# Patient Record
Sex: MALE | Race: BLACK OR AFRICAN AMERICAN
[De-identification: names, ages, dates, MRNs, and addresses within clinical notes are randomized per-mention and may not be internally consistent; named-entity substitution may affect disease eponyms.]

---

## 2019-12-19 ENCOUNTER — APPOINTMENT (OUTPATIENT)
Dept: UROLOGY | Facility: CLINIC | Age: 69
End: 2019-12-19
Payer: MEDICARE

## 2019-12-19 VITALS
DIASTOLIC BLOOD PRESSURE: 80 MMHG | WEIGHT: 172 LBS | SYSTOLIC BLOOD PRESSURE: 130 MMHG | HEIGHT: 71 IN | HEART RATE: 88 BPM | OXYGEN SATURATION: 98 % | RESPIRATION RATE: 13 BRPM | BODY MASS INDEX: 24.08 KG/M2

## 2019-12-19 DIAGNOSIS — Z86.79 PERSONAL HISTORY OF OTHER DISEASES OF THE CIRCULATORY SYSTEM: ICD-10-CM

## 2019-12-19 DIAGNOSIS — Z86.69 PERSONAL HISTORY OF OTHER DISEASES OF THE NERVOUS SYSTEM AND SENSE ORGANS: ICD-10-CM

## 2019-12-19 DIAGNOSIS — Z78.9 OTHER SPECIFIED HEALTH STATUS: ICD-10-CM

## 2019-12-19 PROBLEM — Z00.00 ENCOUNTER FOR PREVENTIVE HEALTH EXAMINATION: Status: ACTIVE | Noted: 2019-12-19

## 2019-12-19 PROCEDURE — 51741 ELECTRO-UROFLOWMETRY FIRST: CPT

## 2019-12-19 PROCEDURE — 99204 OFFICE O/P NEW MOD 45 MIN: CPT | Mod: 25

## 2019-12-19 NOTE — REVIEW OF SYSTEMS
[Eyesight Problems] : eyesight problems [Dry Eyes] : dryness of the eyes [Urine retention] : urine retention [Wake up at night to urinate  How many times?  ___] : wakes up to urinate [unfilled] times during the night [Strain or push to urinate] : strain or push to urinate [Slow urine stream] : slow urine stream [Negative] : Endocrine [FreeTextEntry3] : Frequent Urination

## 2019-12-19 NOTE — PHYSICAL EXAM
[General Appearance - Well Developed] : well developed [General Appearance - Well Nourished] : well nourished [Well Groomed] : well groomed [Normal Appearance] : normal appearance [General Appearance - In No Acute Distress] : no acute distress [Edema] : no peripheral edema [Exaggerated Use Of Accessory Muscles For Inspiration] : no accessory muscle use [Respiration, Rhythm And Depth] : normal respiratory rhythm and effort [Abdomen Soft] : soft [Abdomen Tenderness] : non-tender [Urethral Meatus] : meatus normal [Costovertebral Angle Tenderness] : no ~M costovertebral angle tenderness [Urinary Bladder Findings] : the bladder was normal on palpation [Prostate Tenderness] : the prostate was not tender [Scrotum] : the scrotum was normal [Testes Mass (___cm)] : there were no testicular masses [No Prostate Nodules] : no prostate nodules [Prostate Enlarged] : was enlarged [FreeTextEntry1] : Uncircumcised, vitiligo, one 2 cm plaque palpated dorsally midshaft [No Focal Deficits] : no focal deficits [Normal Station and Gait] : the gait and station were normal for the patient's age [] : no rash [Oriented To Time, Place, And Person] : oriented to person, place, and time [Mood] : the mood was normal [Affect] : the affect was normal [Not Anxious] : not anxious [Inguinal Lymph Nodes Enlarged Bilaterally] : inguinal

## 2019-12-19 NOTE — LETTER BODY
[Dear  ___] : Dear  [unfilled], [Consult Letter:] : I had the pleasure of evaluating your patient, [unfilled]. [Consult Closing:] : Thank you very much for allowing me to participate in the care of this patient.  If you have any questions, please do not hesitate to contact me. [FreeTextEntry1] : [facility]\par [address] \par [place], [place], [address]  \par [phone] \par

## 2019-12-19 NOTE — HISTORY OF PRESENT ILLNESS
[FreeTextEntry1] : He is a 68-year-old man who is seen today for initial visit. He has nocturia 3-5 times despite limiting fluid intake. Urinary flow is slow and interrupted. There is no hematuria or dysuria. There is no flank pain. He does not complain of erectile dysfunction. However, he has noticed curvature of the penis upwards with erections about 45°. There is no history of trauma to the penis. In December 2019, urinalysis was normal and PSA level was 1.86. Maximum urinary flow was 7.2 mL per second, average 3.5 mL/s, voided one 109 cc and residual urine was about 200 cc.

## 2019-12-19 NOTE — ASSESSMENT
[FreeTextEntry1] : We had a long discussion regarding patient's urinary symptoms. Initial workup with cystoscopy, determination of urinary flow rate, post void residual volume and urodynamic study was discussed. We discussed conservative management without using medications such as controlling constipation, limiting fluids before bed-time, and limiting irritating substances such as coffee, tea, alcohol, spicy foods, etc. We also discussed medication therapy for treatment of urinary symptoms with alpha-blocker therapy (such as Flomax, Rapaflo), 5 alpha reductase inhibitors (such as Proscar and Avodart), Cialis 5 mg daily especially if ED is present, anticholinergic medications (such as VESIcare, Toviaz), Myrbetriq or a combination of the above medications. Treatment of overactive bladder symptoms with Botox intravesical injections was reviewed. Also surgical treatment options such as office microwave thermotherapy, photo-vaporization of the prostate (Greenlight laser), TURP or suprapubic prostatectomy based on the size of the prostate, were discussed and side effects reviewed. Questions were answered. He will start Flomax. Also options for Peyronie's disease were discussed, traction device, penile Xiaflex injections and surgical options. He might consider traction device for now.\par \par Milind Vernon MD, FACS\par [facility]\par  of Urology\par \par [address], [address]\[place] [address]\par \par [address], [address]\[place] [address]\par \par Tel: [phone]\par Fax: [phone]

## 2020-05-04 ENCOUNTER — APPOINTMENT (OUTPATIENT)
Dept: UROLOGY | Facility: CLINIC | Age: 70
End: 2020-05-04
Payer: MEDICARE

## 2020-05-04 VITALS
TEMPERATURE: 98 F | HEART RATE: 98 BPM | OXYGEN SATURATION: 98 % | SYSTOLIC BLOOD PRESSURE: 150 MMHG | DIASTOLIC BLOOD PRESSURE: 95 MMHG

## 2020-05-04 PROCEDURE — 99213 OFFICE O/P EST LOW 20 MIN: CPT

## 2020-05-04 NOTE — PHYSICAL EXAM
[General Appearance - Well Nourished] : well nourished [General Appearance - Well Developed] : well developed [Normal Appearance] : normal appearance [Abdomen Soft] : soft [Well Groomed] : well groomed [General Appearance - In No Acute Distress] : no acute distress [Abdomen Tenderness] : non-tender [Costovertebral Angle Tenderness] : no ~M costovertebral angle tenderness [Urethral Meatus] : meatus normal [Testes Mass (___cm)] : there were no testicular masses [Scrotum] : the scrotum was normal [Urinary Bladder Findings] : the bladder was normal on palpation [Prostate Enlarged] : was enlarged [Prostate Tenderness] : the prostate was not tender [No Prostate Nodules] : no prostate nodules [FreeTextEntry1] : (Genital exam done on 12/2019) Uncircumcised, vitiligo, 2 cm plaque palpated dorsally midshaft. [] : no respiratory distress [Respiration, Rhythm And Depth] : normal respiratory rhythm and effort [Exaggerated Use Of Accessory Muscles For Inspiration] : no accessory muscle use [Oriented To Time, Place, And Person] : oriented to person, place, and time [Affect] : the affect was normal [Mood] : the mood was normal [Not Anxious] : not anxious

## 2020-05-04 NOTE — HISTORY OF PRESENT ILLNESS
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 In December 2019, he was started on Flomax. Nocturia, urgency and flow have improved significantly. Nocturia is only 1-3 times now. He is still drinking good amount of coffee every day. Also it seems that his penile curvature has spontaneously improved slightly and it does not bother him. Also residual urine today decreased to 55 cc. There is no hematuria or dysuria. There is no flank pain. \par Previous notes: He used to have nocturia 3-5 times despite limiting fluid intake. Urinary flow was slow and interrupted. He does not complain of erectile dysfunction. However, he has noticed curvature of the penis upwards with erections about 45°. There is no history of trauma to the penis. In December 2019, urinalysis was normal and PSA level was 1.86. Maximum urinary flow was 7.2 mL per second, average 3.5 mL/s, voided one 109 cc and residual urine was about 200 cc.

## 2020-05-04 NOTE — ASSESSMENT
[FreeTextEntry1] : He will continue with Flomax for now. Urinary symptoms and residual urine volume have improved. Side effects discussed. Also penile curvature has subjectively improved. He is not bothered by it. He can followup in 6-9 months.\par \par Milind Vernon MD, FACS\par [facility]\par  of Urology\par \par [address], [address]\[place] [address]\par \par [address], [address]\[place] [address]\par \par Tel: [phone]\par Fax: [phone]

## 2020-05-04 NOTE — LETTER BODY
[Dear  ___] : Dear  [unfilled], [FreeTextEntry1] : [facility]\par [address] \[place], [address]  \par [phone] \par  [Consult Letter:] : I had the pleasure of evaluating your patient, [unfilled]. [Consult Closing:] : Thank you very much for allowing me to participate in the care of this patient.  If you have any questions, please do not hesitate to contact me.

## 2020-08-14 ENCOUNTER — RX RENEWAL (OUTPATIENT)
Age: 70
End: 2020-08-14

## 2020-12-17 ENCOUNTER — APPOINTMENT (OUTPATIENT)
Dept: UROLOGY | Facility: CLINIC | Age: 70
End: 2020-12-17

## 2021-02-10 ENCOUNTER — RX RENEWAL (OUTPATIENT)
Age: 71
End: 2021-02-10

## 2021-03-18 ENCOUNTER — APPOINTMENT (OUTPATIENT)
Dept: UROLOGY | Facility: CLINIC | Age: 71
End: 2021-03-18
Payer: MEDICARE

## 2021-03-18 VITALS
HEIGHT: 71 IN | TEMPERATURE: 98.2 F | BODY MASS INDEX: 24.08 KG/M2 | DIASTOLIC BLOOD PRESSURE: 90 MMHG | OXYGEN SATURATION: 95 % | HEART RATE: 72 BPM | RESPIRATION RATE: 14 BRPM | WEIGHT: 172 LBS | SYSTOLIC BLOOD PRESSURE: 153 MMHG

## 2021-03-18 PROCEDURE — 99213 OFFICE O/P EST LOW 20 MIN: CPT

## 2021-03-18 PROCEDURE — 99072 ADDL SUPL MATRL&STAF TM PHE: CPT

## 2021-03-18 NOTE — PHYSICAL EXAM
[General Appearance - Well Developed] : well developed [General Appearance - Well Nourished] : well nourished [Normal Appearance] : normal appearance [Well Groomed] : well groomed [General Appearance - In No Acute Distress] : no acute distress [Abdomen Soft] : soft [Abdomen Tenderness] : non-tender [Costovertebral Angle Tenderness] : no ~M costovertebral angle tenderness [Urethral Meatus] : meatus normal [Urinary Bladder Findings] : the bladder was normal on palpation [Scrotum] : the scrotum was normal [Testes Mass (___cm)] : there were no testicular masses [Prostate Tenderness] : the prostate was not tender [No Prostate Nodules] : no prostate nodules [Prostate Enlarged] : was enlarged [FreeTextEntry1] : Penile vitiligo, 2 cm plaque palpated dorsally.

## 2021-03-18 NOTE — HISTORY OF PRESENT ILLNESS
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  He is on tamsulosin.  He still complains about slow urinary flow.  However urinary urgency and nocturia have improved.  Nocturia is usually 1-2 times.  There is no hematuria, flank pain or dysuria.  Residual urine today was less than 100 cc. He is still drinking good amount of coffee every day. Penile curvature has spontaneously improved and it does not bother him.  He is due for PSA level.\par \par Previous notes: He used to have nocturia 3-5 times despite limiting fluid intake. Urinary flow was slow and interrupted. He does not complain of erectile dysfunction. However, he has noticed curvature of the penis upwards with erections about 45°. There is no history of trauma to the penis. In December 2019, urinalysis was normal and PSA level was 1.86. Maximum urinary flow was 7.2 mL per second, average 3.5 mL/s, voided one 109 cc and residual urine was about 200 cc.

## 2021-03-18 NOTE — ASSESSMENT
[FreeTextEntry1] : Penile curvature has spontaneously improved and he has no pain.  He is not sexually however very active.  He is still bothered by slow urinary stream.  He could increase Flomax to twice a day to see if symptoms improve.  He will get back to me with his response.  Otherwise he can follow-up in 6 months.\par \par Milind Vernon MD, FACS\par [facility]\par  of Urology\par [address] [place] [address], [address]\[place] [address]\par Tel: [phone]\par Fax: [phone]\par

## 2021-03-18 NOTE — LETTER BODY
[Dear  ___] : Dear  [unfilled], [Consult Letter:] : I had the pleasure of evaluating your patient, [unfilled]. [Consult Closing:] : Thank you very much for allowing me to participate in the care of this patient.  If you have any questions, please do not hesitate to contact me. [FreeTextEntry1] : [facility]\par [address] \[place], [address]  \par [phone] \par

## 2021-03-19 LAB — PSA SERPL-MCNC: 2.82 NG/ML

## 2021-06-24 ENCOUNTER — RX RENEWAL (OUTPATIENT)
Age: 71
End: 2021-06-24

## 2021-09-20 ENCOUNTER — APPOINTMENT (OUTPATIENT)
Dept: UROLOGY | Facility: CLINIC | Age: 71
End: 2021-09-20
Payer: MEDICARE

## 2021-09-20 VITALS
HEIGHT: 71 IN | WEIGHT: 168 LBS | SYSTOLIC BLOOD PRESSURE: 132 MMHG | TEMPERATURE: 98 F | BODY MASS INDEX: 23.52 KG/M2 | DIASTOLIC BLOOD PRESSURE: 70 MMHG

## 2021-09-20 DIAGNOSIS — N48.6 INDURATION PENIS PLASTICA: ICD-10-CM

## 2021-09-20 PROCEDURE — 51736 URINE FLOW MEASUREMENT: CPT

## 2021-09-20 PROCEDURE — 99213 OFFICE O/P EST LOW 20 MIN: CPT

## 2021-09-20 NOTE — HISTORY OF PRESENT ILLNESS
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  He increase tamsulosin to twice a day because of slow urinary stream.  He seems to be somewhat more satisfied.  Sometimes nocturia is only twice and at other times more frequent.  He does not have significant daytime symptoms.  PSA level was 2.8 in March 2021.  Uroflow today showed maximum 11.2, average 3.9 mL/s, voided 132 cc and the residual urine volume was 150 cc. Penile curvature has spontaneously improved and it does not bother him.  \par \par Previous notes: He used to have nocturia 3-5 times despite limiting fluid intake. Urinary flow was slow and interrupted. He does not complain of erectile dysfunction. However, he has noticed curvature of the penis upwards with erections about 45°. There is no history of trauma to the penis.

## 2021-09-20 NOTE — LETTER BODY
[Dear  ___] : Dear  [unfilled], [Consult Letter:] : I had the pleasure of evaluating your patient, [unfilled]. [Consult Closing:] : Thank you very much for allowing me to participate in the care of this patient.  If you have any questions, please do not hesitate to contact me. [FreeTextEntry1] : [facility]\par [address] \[place], [address]  \par [phone] \par

## 2021-09-20 NOTE — PHYSICAL EXAM
[General Appearance - Well Developed] : well developed [General Appearance - Well Nourished] : well nourished [Normal Appearance] : normal appearance [Well Groomed] : well groomed [General Appearance - In No Acute Distress] : no acute distress [Abdomen Soft] : soft [Abdomen Tenderness] : non-tender [Costovertebral Angle Tenderness] : no ~M costovertebral angle tenderness [Urethral Meatus] : meatus normal [Urinary Bladder Findings] : the bladder was normal on palpation [Scrotum] : the scrotum was normal [Prostate Tenderness] : the prostate was not tender [Testes Mass (___cm)] : there were no testicular masses [No Prostate Nodules] : no prostate nodules [Prostate Enlarged] : was enlarged [FreeTextEntry1] : Penile vitiligo, 2 cm plaque palpated dorsally unchanged.  [Respiration, Rhythm And Depth] : normal respiratory rhythm and effort [] : no respiratory distress [Exaggerated Use Of Accessory Muscles For Inspiration] : no accessory muscle use [Oriented To Time, Place, And Person] : oriented to person, place, and time [Affect] : the affect was normal [Mood] : the mood was normal [Not Anxious] : not anxious

## 2021-09-20 NOTE — ASSESSMENT
[FreeTextEntry1] : He seems to be voiding better on tamsulosin twice a day.  Uroflow and residual urine volumes were discussed.  PSA level was normal.  He will try to wean himself to tamsulosin once a day if possible.  He is not bothered by penile plaque.  He will follow up in 6 months to 1 year.

## 2022-03-21 ENCOUNTER — RX RENEWAL (OUTPATIENT)
Age: 72
End: 2022-03-21

## 2022-04-12 ENCOUNTER — APPOINTMENT (OUTPATIENT)
Dept: VASCULAR SURGERY | Facility: CLINIC | Age: 72
End: 2022-04-12
Payer: MEDICARE

## 2022-04-12 VITALS
SYSTOLIC BLOOD PRESSURE: 174 MMHG | WEIGHT: 173 LBS | HEIGHT: 71 IN | TEMPERATURE: 98.1 F | DIASTOLIC BLOOD PRESSURE: 104 MMHG | HEART RATE: 81 BPM | BODY MASS INDEX: 24.22 KG/M2

## 2022-04-12 PROCEDURE — 99203 OFFICE O/P NEW LOW 30 MIN: CPT

## 2022-04-12 PROCEDURE — 93970 EXTREMITY STUDY: CPT

## 2022-04-12 RX ORDER — LATANOPROST/PF 0.005 %
0.01 DROPS OPHTHALMIC (EYE)
Refills: 0 | Status: DISCONTINUED | COMMUNITY
End: 2022-04-12

## 2022-04-12 RX ORDER — TIMOLOL MALEATE 5 MG/ML
0.5 SOLUTION OPHTHALMIC
Refills: 0 | Status: DISCONTINUED | COMMUNITY
End: 2022-04-12

## 2022-04-12 RX ORDER — ENALAPRIL MALEATE 5 MG/1
5 TABLET ORAL
Refills: 0 | Status: DISCONTINUED | COMMUNITY
End: 2022-04-12

## 2022-04-12 NOTE — ASSESSMENT
[FreeTextEntry1] : 72 yo male with history of htn, bph presents for evaluation of b/l lower extremity large varicose veins\par \par duplex shows no evidence of dvt/svt and insufficiency of the right gsv and pop vein, left lower extremity insufficiency in the pop and ssv and posterior calf varicose veins.  \par \par pt reports only mild symptoms at this time would recommend conservative management with compression stockings and elevation.  will follow up in 3 months

## 2022-04-12 NOTE — HISTORY OF PRESENT ILLNESS
[FreeTextEntry1] : 70 yo male with history of htn, bph presents for evaluation of b/l lower extremity large varicose veins.  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  pt denies any pain other then a rushing sensation of blood when he stands pt states that he wears compression stockings with some relief in the discomfort.  pt states that when he is standing he also has the sensation of a pooling effect.  pt denies any history of dvt, ulcers or bleeding from the veins

## 2022-04-12 NOTE — PHYSICAL EXAM
[2+] : left 2+ [Varicose Veins Of Lower Extremities] : bilaterally [Ankle Swelling On The Left] : moderate [No Rash or Lesion] : No rash or lesion [Alert] : alert [Calm] : calm [JVD] : no jugular venous distention  [Normal Breath Sounds] : Normal breath sounds [Normal Rate and Rhythm] : normal rate and rhythm [Ankle Swelling (On Exam)] : not present [] : not present [Skin Ulcer] : no ulcer [de-identified] : appears well [de-identified] : no calf tenderness, no palpable cords

## 2022-04-26 ENCOUNTER — TRANSCRIPTION ENCOUNTER (OUTPATIENT)
Age: 72
End: 2022-04-26

## 2022-07-26 ENCOUNTER — APPOINTMENT (OUTPATIENT)
Dept: VASCULAR SURGERY | Facility: CLINIC | Age: 72
End: 2022-07-26

## 2022-07-26 VITALS
BODY MASS INDEX: 24.22 KG/M2 | WEIGHT: 173 LBS | SYSTOLIC BLOOD PRESSURE: 132 MMHG | HEART RATE: 97 BPM | HEIGHT: 71 IN | DIASTOLIC BLOOD PRESSURE: 85 MMHG

## 2022-07-26 PROCEDURE — 99213 OFFICE O/P EST LOW 20 MIN: CPT

## 2022-07-28 NOTE — HISTORY OF PRESENT ILLNESS
[FreeTextEntry1] : 72 yo male with history of htn, bph presents for evaluation of b/l lower extremity large varicose veins.  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  pt denies any pain other then a rushing sensation of blood when he stands pt states that he wears compression stockings with no relief in the discomfort.  pt states that when he is standing he also has the sensation of a pooling effect.  pt denies any history of dvt, ulcers or bleeding from the veins

## 2022-07-28 NOTE — PHYSICAL EXAM
[JVD] : no jugular venous distention  [Normal Breath Sounds] : Normal breath sounds [Normal Rate and Rhythm] : normal rate and rhythm [2+] : left 2+ [Ankle Swelling (On Exam)] : not present [Varicose Veins Of Lower Extremities] : bilaterally [Ankle Swelling On The Left] : moderate [] : not present [No Rash or Lesion] : No rash or lesion [Skin Ulcer] : no ulcer [Alert] : alert [Calm] : calm [de-identified] : appears well [de-identified] : no calf tenderness, no palpable cords

## 2022-07-28 NOTE — ASSESSMENT
[FreeTextEntry1] : 72 yo male with history of htn, bph presents for evaluation of b/l lower extremity large varicose veins\par \par duplex shows no evidence of dvt/svt and insufficiency of the right gsv and pop vein, left lower extremity insufficiency in the pop and ssv and posterior calf varicose veins.  \par \par ongoing symptoms worsening\par for laser ablation

## 2022-08-25 LAB
ANION GAP SERPL CALC-SCNC: 9 MMOL/L
BASOPHILS # BLD AUTO: 0.01 K/UL
BASOPHILS NFR BLD AUTO: 0.3 %
BUN SERPL-MCNC: 13 MG/DL
CALCIUM SERPL-MCNC: 9.9 MG/DL
CHLORIDE SERPL-SCNC: 108 MMOL/L
CO2 SERPL-SCNC: 26 MMOL/L
CREAT SERPL-MCNC: 1.38 MG/DL
EGFR: 55 ML/MIN/1.73M2
EOSINOPHIL # BLD AUTO: 0.04 K/UL
EOSINOPHIL NFR BLD AUTO: 1.1 %
GLUCOSE SERPL-MCNC: 102 MG/DL
HCT VFR BLD CALC: 39.7 %
HGB BLD-MCNC: 13.7 G/DL
IMM GRANULOCYTES NFR BLD AUTO: 0.3 %
LYMPHOCYTES # BLD AUTO: 1.22 K/UL
LYMPHOCYTES NFR BLD AUTO: 33 %
MAN DIFF?: NORMAL
MCHC RBC-ENTMCNC: 32.3 PG
MCHC RBC-ENTMCNC: 34.5 GM/DL
MCV RBC AUTO: 93.6 FL
MONOCYTES # BLD AUTO: 0.51 K/UL
MONOCYTES NFR BLD AUTO: 13.8 %
NEUTROPHILS # BLD AUTO: 1.91 K/UL
NEUTROPHILS NFR BLD AUTO: 51.5 %
PLATELET # BLD AUTO: 199 K/UL
POTASSIUM SERPL-SCNC: 4.6 MMOL/L
RBC # BLD: 4.24 M/UL
RBC # FLD: 12.4 %
SARS-COV-2 N GENE NPH QL NAA+PROBE: DETECTED
SODIUM SERPL-SCNC: 143 MMOL/L
WBC # FLD AUTO: 3.7 K/UL

## 2022-09-19 ENCOUNTER — RX RENEWAL (OUTPATIENT)
Age: 72
End: 2022-09-19

## 2022-09-28 ENCOUNTER — APPOINTMENT (OUTPATIENT)
Dept: UROLOGY | Facility: CLINIC | Age: 72
End: 2022-09-28

## 2022-09-28 PROCEDURE — 99213 OFFICE O/P EST LOW 20 MIN: CPT

## 2022-09-28 NOTE — LETTER BODY
[Dear  ___] : Dear  [unfilled], [Consult Letter:] : I had the pleasure of evaluating your patient, [unfilled]. [Consult Closing:] : Thank you very much for allowing me to participate in the care of this patient.  If you have any questions, please do not hesitate to contact me. [FreeTextEntry1] : [facility]\par [address] \[place], [place], [address]  \par [phone] \par

## 2022-09-28 NOTE — ASSESSMENT
[FreeTextEntry1] : He will continue with tamsulosin once a night for now.  Potential side effects of finasteride related to erectile dysfunction and prostate cancer were reviewed.  PSA level was stable.  Residual urine volume is also acceptable.  He will follow-up in 1 year.\par \par Milind Vernon MD, FACS\par [facility]\par  of Urology\par \par [address]\[place] [address]\par \par [address]\[place] [address]\par \par Tel: [phone]\par Fax: [phone]

## 2022-09-28 NOTE — HISTORY OF PRESENT ILLNESS
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  He is on tamsulosin once a night.  He used to take it twice a day.  Sometimes he has lightheadedness in the afternoon but he thinks is from Vasotec.  Nocturia twice.  PSA level was 2.37 in December 2021.  Residual urine volume today was about 100 mL.  He has questions about finasteride.\par \par Uroflow showed maximum 11.2, average 3.9 mL/s, voided 132 cc and the residual urine volume was 150 cc. Penile curvature has spontaneously improved and it does not bother him.  \par \par Previous notes: He used to have nocturia 3-5 times despite limiting fluid intake. Urinary flow was slow and interrupted. He does not complain of erectile dysfunction. However, he has noticed curvature of the penis upwards with erections about 45°. There is no history of trauma to the penis.

## 2022-09-28 NOTE — PHYSICAL EXAM
[Urethral Meatus] : meatus normal [Urinary Bladder Findings] : the bladder was normal on palpation [Scrotum] : the scrotum was normal [Testes Mass (___cm)] : there were no testicular masses [Prostate Tenderness] : the prostate was not tender [No Prostate Nodules] : no prostate nodules [Prostate Enlarged] : was enlarged [General Appearance - Well Developed] : well developed [General Appearance - Well Nourished] : well nourished [Normal Appearance] : normal appearance [Well Groomed] : well groomed [General Appearance - In No Acute Distress] : no acute distress [Abdomen Soft] : soft [Abdomen Tenderness] : non-tender [Costovertebral Angle Tenderness] : no ~M costovertebral angle tenderness [FreeTextEntry1] : Penile vitiligo, 2 cm plaque dorsally unchanged.  [] : no respiratory distress [Respiration, Rhythm And Depth] : normal respiratory rhythm and effort [Exaggerated Use Of Accessory Muscles For Inspiration] : no accessory muscle use [Oriented To Time, Place, And Person] : oriented to person, place, and time [Affect] : the affect was normal [Mood] : the mood was normal [Not Anxious] : not anxious

## 2022-10-24 LAB — SARS-COV-2 N GENE NPH QL NAA+PROBE: NOT DETECTED

## 2022-10-26 PROBLEM — I83.90 VARICOSE VEINS: Status: ACTIVE | Noted: 2022-04-12

## 2022-10-27 ENCOUNTER — LABORATORY RESULT (OUTPATIENT)
Age: 72
End: 2022-10-27

## 2022-10-27 ENCOUNTER — APPOINTMENT (OUTPATIENT)
Dept: ENDOVASCULAR SURGERY | Facility: CLINIC | Age: 72
End: 2022-10-27

## 2022-10-27 ENCOUNTER — NON-APPOINTMENT (OUTPATIENT)
Age: 72
End: 2022-10-27

## 2022-10-27 DIAGNOSIS — I83.90 ASYMPTOMATIC VARICOSE VEINS OF UNSPECIFIED LOWER EXTREMITY: ICD-10-CM

## 2022-10-27 PROCEDURE — 36478 ENDOVENOUS LASER 1ST VEIN: CPT | Mod: RT

## 2022-10-27 PROCEDURE — 37765 STAB PHLEB VEINS XTR 10-20: CPT | Mod: RT

## 2022-10-29 NOTE — PROCEDURE
[D/C IV on discharge] : D/C IV on discharge [Resume diet] : resume diet [Dressing checked for bleeding] : Dressing checked for bleeding [Vital signs on admission the q 15 mins x2] : Vital signs on admission the q 15 mins x2 [FreeTextEntry1] : right leg endovenous laser ablation/phlebectomy

## 2022-10-29 NOTE — HISTORY OF PRESENT ILLNESS
[FreeTextEntry1] : accmpanied by\par covid not detected 10/22/2022 [FreeTextEntry5] : yesterday at [FreeTextEntry6] : Dr. Braxton

## 2022-10-29 NOTE — PHYSICAL EXAM
[Normal Breath Sounds] : Normal breath sounds [Normal Rate and Rhythm] : normal rate and rhythm [2+] : left 2+ [Varicose Veins Of Lower Extremities] : bilaterally [Ankle Swelling On The Left] : moderate [No Rash or Lesion] : No rash or lesion [Alert] : alert [Calm] : calm [JVD] : no jugular venous distention  [Ankle Swelling (On Exam)] : not present [] : not present [Skin Ulcer] : no ulcer [de-identified] : no calf tenderness, no palpable cords  [de-identified] : appears well

## 2022-10-29 NOTE — PAST MEDICAL HISTORY
[Increasing age ( >40 years old)] : Increasing age ( >40 years old) [Varicose Veins] : Varicose Veins [No therapy indicated for cases scheduled for less than one hour] : No therapy indicated for cases scheduled for less than one hour. [FreeTextEntry1] : Malignant Hyperthermia Screening Tool and Risk of Bleeding Assessment\par \par Mr. ECTOR GARCIA denies family history of unexpected death following Anesthesia or Exercise.\par Denies Family history of Malignant Hyperthermia, Muscle or Neuromuscular disorder and High Temperature following exercise.\par \par Mr. ECTOR GARCIA denies history of Muscle Spasm, Dark or Chocolate - Colored urine and Unanticipated fever immediately following anesthesia or serious exercise. \par Mr. GARCIA also denies bleeding tendencies/ Risks of Bleeding.\par

## 2022-11-01 ENCOUNTER — APPOINTMENT (OUTPATIENT)
Dept: VASCULAR SURGERY | Facility: CLINIC | Age: 72
End: 2022-11-01

## 2022-11-01 PROCEDURE — 99024 POSTOP FOLLOW-UP VISIT: CPT

## 2022-11-01 PROCEDURE — 93971 EXTREMITY STUDY: CPT | Mod: RT

## 2022-11-03 NOTE — DISCUSSION/SUMMARY
[FreeTextEntry1] : Duplex study shows no evidence of DVT.  Patient doing well and will follow-up in 1 month. oral

## 2022-11-03 NOTE — REASON FOR VISIT
[de-identified] : Ablation right greater saphenous vein [de-identified] : Status post ablation right greater saphenous vein with phlebectomy.  Doing well.

## 2022-11-12 ENCOUNTER — NON-APPOINTMENT (OUTPATIENT)
Age: 72
End: 2022-11-12

## 2022-12-06 ENCOUNTER — APPOINTMENT (OUTPATIENT)
Dept: VASCULAR SURGERY | Facility: CLINIC | Age: 72
End: 2022-12-06

## 2022-12-06 PROCEDURE — 99213 OFFICE O/P EST LOW 20 MIN: CPT

## 2022-12-06 NOTE — ASSESSMENT
[FreeTextEntry1] : At this time no need for further intervention.\par Leg is much improved.  Patient may follow-up as needed.

## 2022-12-06 NOTE — PHYSICAL EXAM
[Normal Breath Sounds] : Normal breath sounds [Normal Rate and Rhythm] : normal rate and rhythm [2+] : left 2+ [Ankle Swelling (On Exam)] : not present [Varicose Veins Of Lower Extremities] : not present [] : not present [Abdomen Tenderness] : ~T ~M No abdominal tenderness [No Rash or Lesion] : No rash or lesion [Alert] : alert [Calm] : calm [de-identified] : Appears well

## 2022-12-06 NOTE — HISTORY OF PRESENT ILLNESS
[FreeTextEntry1] : 71-year-old gentleman with a long history of venous insufficiency is now status post right greater saphenous vein ablation with phlebectomy.  Patient has no complaints.  He states that the leg feels much better

## 2022-12-16 ENCOUNTER — RX RENEWAL (OUTPATIENT)
Age: 72
End: 2022-12-16

## 2023-12-11 ENCOUNTER — RX RENEWAL (OUTPATIENT)
Age: 73
End: 2023-12-11

## 2024-03-11 ENCOUNTER — RX RENEWAL (OUTPATIENT)
Age: 74
End: 2024-03-11

## 2024-06-07 ENCOUNTER — APPOINTMENT (OUTPATIENT)
Dept: UROLOGY | Facility: CLINIC | Age: 74
End: 2024-06-07
Payer: MEDICARE

## 2024-06-07 VITALS
HEIGHT: 71 IN | DIASTOLIC BLOOD PRESSURE: 85 MMHG | TEMPERATURE: 98.2 F | SYSTOLIC BLOOD PRESSURE: 137 MMHG | HEART RATE: 76 BPM | WEIGHT: 173 LBS | OXYGEN SATURATION: 100 % | BODY MASS INDEX: 24.22 KG/M2 | RESPIRATION RATE: 14 BRPM

## 2024-06-07 DIAGNOSIS — N40.1 BENIGN PROSTATIC HYPERPLASIA WITH LOWER URINARY TRACT SYMPMS: ICD-10-CM

## 2024-06-07 DIAGNOSIS — R35.1 BENIGN PROSTATIC HYPERPLASIA WITH LOWER URINARY TRACT SYMPMS: ICD-10-CM

## 2024-06-07 DIAGNOSIS — Z12.5 ENCOUNTER FOR SCREENING FOR MALIGNANT NEOPLASM OF PROSTATE: ICD-10-CM

## 2024-06-07 PROCEDURE — G2211 COMPLEX E/M VISIT ADD ON: CPT

## 2024-06-07 PROCEDURE — 99213 OFFICE O/P EST LOW 20 MIN: CPT

## 2024-06-07 NOTE — ASSESSMENT
[FreeTextEntry1] : Incomplete bladder emptying was noted and it is chronic for him.  He will continue with tamsulosin.  He seems to be satisfied with his urination at this time.  PSA level will be checked and pending results, he can follow-up in 6 months to 1 year.  Milind Vernon MD, FACS [facility]  of Urology  [address], [address] [place] [address]  Tel: [phone] Fax: [phone]

## 2024-06-07 NOTE — HISTORY OF PRESENT ILLNESS
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  He seems to be satisfied with urination.  Nocturia is once or twice.  He is on tamsulosin.  Residual urine volume today was about 200 mL.  He is repeating PSA level today.  In December 2022 PSA level was 2.67. PSA level was 2.37 in December 2021.    Uroflow showed maximum 11.2, average 3.9 mL/s, voided 132 cc and the residual urine volume was 150 cc. Penile curvature has spontaneously improved.    Previous notes: He used to have nocturia 3-5 times despite limiting fluid intake. Urinary flow was slow and interrupted. He does not complain of erectile dysfunction. However, he has noticed curvature of the penis upwards with erections about 45. There is no history of trauma to the penis.

## 2024-06-07 NOTE — LETTER BODY
[Dear  ___] : Dear  [unfilled], [Consult Letter:] : I had the pleasure of evaluating your patient, [unfilled]. [Consult Closing:] : Thank you very much for allowing me to participate in the care of this patient.  If you have any questions, please do not hesitate to contact me. [FreeTextEntry1] : [facility]\par  [address] \[place]  [place], [place], [address]  \par  [phone] \par

## 2024-06-07 NOTE — PHYSICAL EXAM
[Urethral Meatus] : meatus normal [Urinary Bladder Findings] : the bladder was normal on palpation [Scrotum] : the scrotum was normal [Testes Mass (___cm)] : there were no testicular masses [Prostate Tenderness] : the prostate was not tender [No Prostate Nodules] : no prostate nodules [Prostate Enlarged] : was enlarged [FreeTextEntry1] : Penile vitiligo, 2 cm plaque dorsally unchanged.  Genital exam was done previously [General Appearance - Well Developed] : well developed [General Appearance - Well Nourished] : well nourished [Normal Appearance] : normal appearance [Well Groomed] : well groomed [] : no respiratory distress [Exaggerated Use Of Accessory Muscles For Inspiration] : no accessory muscle use [Abdomen Soft] : soft [Abdomen Tenderness] : non-tender [Oriented To Time, Place, And Person] : oriented to person, place, and time [Affect] : the affect was normal [Mood] : the mood was normal [Not Anxious] : not anxious

## 2024-06-09 LAB — PSA SERPL-MCNC: 2.66 NG/ML

## 2024-06-10 ENCOUNTER — RX RENEWAL (OUTPATIENT)
Age: 74
End: 2024-06-10

## 2024-06-10 RX ORDER — TAMSULOSIN HYDROCHLORIDE 0.4 MG/1
0.4 CAPSULE ORAL
Qty: 90 | Refills: 3 | Status: ACTIVE | COMMUNITY
Start: 2019-12-19 | End: 1900-01-01

## 2025-06-07 ENCOUNTER — NON-APPOINTMENT (OUTPATIENT)
Age: 75
End: 2025-06-07

## 2025-06-09 ENCOUNTER — NON-APPOINTMENT (OUTPATIENT)
Age: 75
End: 2025-06-09

## 2025-06-09 ENCOUNTER — APPOINTMENT (OUTPATIENT)
Dept: UROLOGY | Facility: CLINIC | Age: 75
End: 2025-06-09
Payer: MEDICARE

## 2025-06-09 VITALS
SYSTOLIC BLOOD PRESSURE: 167 MMHG | WEIGHT: 170 LBS | HEART RATE: 73 BPM | BODY MASS INDEX: 23.8 KG/M2 | DIASTOLIC BLOOD PRESSURE: 84 MMHG | TEMPERATURE: 98 F | HEIGHT: 71 IN | OXYGEN SATURATION: 99 % | RESPIRATION RATE: 16 BRPM

## 2025-06-09 PROCEDURE — 99213 OFFICE O/P EST LOW 20 MIN: CPT

## 2025-06-09 PROCEDURE — 51736 URINE FLOW MEASUREMENT: CPT

## 2025-06-09 RX ORDER — AMLODIPINE BESYLATE 10 MG/1
10 TABLET ORAL
Refills: 0 | Status: ACTIVE | COMMUNITY